# Patient Record
Sex: MALE | Race: WHITE | NOT HISPANIC OR LATINO | Employment: UNEMPLOYED | URBAN - METROPOLITAN AREA
[De-identification: names, ages, dates, MRNs, and addresses within clinical notes are randomized per-mention and may not be internally consistent; named-entity substitution may affect disease eponyms.]

---

## 2017-09-05 ENCOUNTER — LAB REQUISITION (OUTPATIENT)
Dept: LAB | Facility: HOSPITAL | Age: 42
End: 2017-09-05

## 2017-09-05 DIAGNOSIS — J32.4 CHRONIC PANSINUSITIS: ICD-10-CM

## 2017-09-05 PROCEDURE — 87147 CULTURE TYPE IMMUNOLOGIC: CPT | Performed by: OTOLARYNGOLOGY

## 2017-09-05 PROCEDURE — 87205 SMEAR GRAM STAIN: CPT | Performed by: OTOLARYNGOLOGY

## 2017-09-05 PROCEDURE — 87070 CULTURE OTHR SPECIMN AEROBIC: CPT | Performed by: OTOLARYNGOLOGY

## 2017-09-05 PROCEDURE — 87186 SC STD MICRODIL/AGAR DIL: CPT | Performed by: OTOLARYNGOLOGY

## 2017-09-05 PROCEDURE — 87185 SC STD ENZYME DETCJ PER NZM: CPT | Performed by: OTOLARYNGOLOGY

## 2017-09-05 PROCEDURE — 87077 CULTURE AEROBIC IDENTIFY: CPT | Performed by: OTOLARYNGOLOGY

## 2017-09-08 LAB
BACTERIA SPEC AEROBE CULT: ABNORMAL
GRAM STN SPEC: ABNORMAL
GRAM STN SPEC: ABNORMAL

## 2017-10-09 ENCOUNTER — LAB REQUISITION (OUTPATIENT)
Dept: LAB | Facility: HOSPITAL | Age: 42
End: 2017-10-09

## 2017-10-09 DIAGNOSIS — J32.4 CHRONIC PANSINUSITIS: ICD-10-CM

## 2017-10-09 PROCEDURE — 87205 SMEAR GRAM STAIN: CPT | Performed by: OTOLARYNGOLOGY

## 2017-10-09 PROCEDURE — 87070 CULTURE OTHR SPECIMN AEROBIC: CPT | Performed by: OTOLARYNGOLOGY

## 2017-10-09 PROCEDURE — 87186 SC STD MICRODIL/AGAR DIL: CPT | Performed by: OTOLARYNGOLOGY

## 2017-10-09 PROCEDURE — 87077 CULTURE AEROBIC IDENTIFY: CPT | Performed by: OTOLARYNGOLOGY

## 2017-10-09 PROCEDURE — 87185 SC STD ENZYME DETCJ PER NZM: CPT | Performed by: OTOLARYNGOLOGY

## 2017-10-09 PROCEDURE — 87147 CULTURE TYPE IMMUNOLOGIC: CPT | Performed by: OTOLARYNGOLOGY

## 2017-10-11 LAB
BACTERIA SPEC AEROBE CULT: ABNORMAL
BACTERIA SPEC AEROBE CULT: ABNORMAL
GRAM STN SPEC: ABNORMAL

## 2017-10-30 ENCOUNTER — TRANSCRIBE ORDERS (OUTPATIENT)
Dept: ADMINISTRATIVE | Facility: HOSPITAL | Age: 42
End: 2017-10-30

## 2017-10-30 DIAGNOSIS — D38.5 NEOPLASM OF UNCERTAIN BEHAVIOR OF PARANASAL SINUS: Primary | ICD-10-CM

## 2017-11-06 ENCOUNTER — HOSPITAL ENCOUNTER (OUTPATIENT)
Dept: CT IMAGING | Facility: HOSPITAL | Age: 42
Discharge: HOME | End: 2017-11-06
Attending: OTOLARYNGOLOGY | Admitting: OTOLARYNGOLOGY

## 2017-11-06 DIAGNOSIS — D38.5 NEOPLASM OF UNCERTAIN BEHAVIOR OF PARANASAL SINUS: ICD-10-CM

## 2017-11-06 PROCEDURE — 70486 CT MAXILLOFACIAL W/O DYE: CPT

## 2023-07-31 ENCOUNTER — LAB (OUTPATIENT)
Dept: LAB | Facility: HOSPITAL | Age: 48
End: 2023-07-31
Payer: COMMERCIAL

## 2023-07-31 ENCOUNTER — CONSULT (OUTPATIENT)
Dept: ONCOLOGY | Facility: CLINIC | Age: 48
End: 2023-07-31
Payer: COMMERCIAL

## 2023-07-31 VITALS
TEMPERATURE: 97.7 F | DIASTOLIC BLOOD PRESSURE: 82 MMHG | HEART RATE: 71 BPM | HEIGHT: 67 IN | WEIGHT: 151.2 LBS | SYSTOLIC BLOOD PRESSURE: 130 MMHG | BODY MASS INDEX: 23.73 KG/M2

## 2023-07-31 DIAGNOSIS — D72.828 OTHER ELEVATED WHITE BLOOD CELL (WBC) COUNT: ICD-10-CM

## 2023-07-31 DIAGNOSIS — D72.828 OTHER ELEVATED WHITE BLOOD CELL (WBC) COUNT: Primary | ICD-10-CM

## 2023-07-31 PROBLEM — D72.829 LEUKOCYTOSIS: Status: ACTIVE | Noted: 2023-07-31

## 2023-07-31 LAB
ALBUMIN SERPL-MCNC: 4 G/DL (ref 3.5–5.2)
ALBUMIN/GLOB SERPL: 1.4 G/DL
ALP SERPL-CCNC: 82 U/L (ref 39–117)
ALT SERPL W P-5'-P-CCNC: 15 U/L (ref 1–41)
ANION GAP SERPL CALCULATED.3IONS-SCNC: 12 MMOL/L (ref 5–15)
AST SERPL-CCNC: 18 U/L (ref 1–40)
BASOPHILS # BLD AUTO: 0.04 10*3/MM3 (ref 0–0.2)
BASOPHILS NFR BLD AUTO: 0.2 % (ref 0–1.5)
BILIRUB SERPL-MCNC: 0.4 MG/DL (ref 0–1.2)
BUN SERPL-MCNC: 11 MG/DL (ref 6–20)
BUN/CREAT SERPL: 9.4 (ref 7–25)
CALCIUM SPEC-SCNC: 9.2 MG/DL (ref 8.6–10.5)
CHLORIDE SERPL-SCNC: 110 MMOL/L (ref 98–107)
CO2 SERPL-SCNC: 22 MMOL/L (ref 22–29)
CREAT SERPL-MCNC: 1.17 MG/DL (ref 0.76–1.27)
DEPRECATED RDW RBC AUTO: 48.9 FL (ref 37–54)
EGFRCR SERPLBLD CKD-EPI 2021: 77.4 ML/MIN/1.73
EOSINOPHIL # BLD AUTO: 0.1 10*3/MM3 (ref 0–0.4)
EOSINOPHIL NFR BLD AUTO: 0.6 % (ref 0.3–6.2)
ERYTHROCYTE [DISTWIDTH] IN BLOOD BY AUTOMATED COUNT: 14 % (ref 12.3–15.4)
GLOBULIN UR ELPH-MCNC: 2.9 GM/DL
GLUCOSE SERPL-MCNC: 68 MG/DL (ref 65–99)
HCT VFR BLD AUTO: 44.1 % (ref 37.5–51)
HGB BLD-MCNC: 15.1 G/DL (ref 13–17.7)
IMM GRANULOCYTES # BLD AUTO: 0.09 10*3/MM3 (ref 0–0.05)
IMM GRANULOCYTES NFR BLD AUTO: 0.5 % (ref 0–0.5)
LDH SERPL-CCNC: 247 U/L (ref 135–225)
LYMPHOCYTES # BLD AUTO: 3.13 10*3/MM3 (ref 0.7–3.1)
LYMPHOCYTES NFR BLD AUTO: 17.6 % (ref 19.6–45.3)
MCH RBC QN AUTO: 32.1 PG (ref 26.6–33)
MCHC RBC AUTO-ENTMCNC: 34.2 G/DL (ref 31.5–35.7)
MCV RBC AUTO: 93.6 FL (ref 79–97)
MONOCYTES # BLD AUTO: 0.97 10*3/MM3 (ref 0.1–0.9)
MONOCYTES NFR BLD AUTO: 5.5 % (ref 5–12)
NEUTROPHILS NFR BLD AUTO: 13.41 10*3/MM3 (ref 1.7–7)
NEUTROPHILS NFR BLD AUTO: 75.6 % (ref 42.7–76)
PLATELET # BLD AUTO: 253 10*3/MM3 (ref 140–450)
PMV BLD AUTO: 10.1 FL (ref 6–12)
POTASSIUM SERPL-SCNC: 3.8 MMOL/L (ref 3.5–5.2)
PROT SERPL-MCNC: 6.9 G/DL (ref 6–8.5)
RBC # BLD AUTO: 4.71 10*6/MM3 (ref 4.14–5.8)
SODIUM SERPL-SCNC: 144 MMOL/L (ref 136–145)
WBC NRBC COR # BLD: 17.74 10*3/MM3 (ref 3.4–10.8)

## 2023-07-31 PROCEDURE — 85025 COMPLETE CBC W/AUTO DIFF WBC: CPT

## 2023-07-31 PROCEDURE — 80053 COMPREHEN METABOLIC PANEL: CPT

## 2023-07-31 PROCEDURE — 36415 COLL VENOUS BLD VENIPUNCTURE: CPT

## 2023-07-31 PROCEDURE — 85060 BLOOD SMEAR INTERPRETATION: CPT

## 2023-07-31 PROCEDURE — 1126F AMNT PAIN NOTED NONE PRSNT: CPT | Performed by: INTERNAL MEDICINE

## 2023-07-31 PROCEDURE — 83615 LACTATE (LD) (LDH) ENZYME: CPT

## 2023-07-31 PROCEDURE — 99204 OFFICE O/P NEW MOD 45 MIN: CPT | Performed by: INTERNAL MEDICINE

## 2023-08-01 LAB
CYTOLOGIST CVX/VAG CYTO: NORMAL
PATH INTERP BLD-IMP: NORMAL

## 2023-08-02 LAB — Lab: NORMAL

## 2023-08-04 LAB — BCR ABL RESULT: NORMAL

## 2023-09-06 ENCOUNTER — TELEPHONE (OUTPATIENT)
Dept: ONCOLOGY | Facility: CLINIC | Age: 48
End: 2023-09-06
Payer: COMMERCIAL

## 2023-09-06 NOTE — TELEPHONE ENCOUNTER
Return call to Kang.  Kang requesting lab results be faxed to Dr Frederick.  Faxed results to [phone]

## 2023-09-06 NOTE — TELEPHONE ENCOUNTER
Caller: Kang Marvin Jr.    Relationship: Self    Best call back number: [phone]      What was the call regarding: PT CALLED WANTED TO SEE IF HE CAN GET A LAB ORDER TO CHECK HIS WHITE BLOOD CELL COUNT.    PLEASE CALL TO ADVISE

## 2024-06-07 ENCOUNTER — TELEPHONE (OUTPATIENT)
Dept: ONCOLOGY | Facility: CLINIC | Age: 49
End: 2024-06-07
Payer: COMMERCIAL

## 2024-06-07 NOTE — TELEPHONE ENCOUNTER
[facility] received a fax that requires your attention. The document has been indexed to the patient’s chart for your review.      Reason for sending: RECEIVED URGENT REFERRAL FOR EXISTING PATIENT    Documents Description: REFERRAL 06/07/24, MEDICAL SUMMARY 06/07/24, PROGRESS NOTE 06/05/24, 06/04/24, 05/07/24, NURSE NOTE 06/04/24, LAB 05/07/24, 06/04/24, 06/05/24.> INDEXED IN CHART    Name of Sender: VIC KAUR [phone]    Date Indexed: 06/07/24    Notes (if needed): THANKS!

## 2024-06-24 ENCOUNTER — OFFICE VISIT (OUTPATIENT)
Dept: ONCOLOGY | Facility: CLINIC | Age: 49
End: 2024-06-24
Payer: COMMERCIAL

## 2024-06-24 ENCOUNTER — LAB (OUTPATIENT)
Dept: LAB | Facility: HOSPITAL | Age: 49
End: 2024-06-24
Payer: COMMERCIAL

## 2024-06-24 VITALS
DIASTOLIC BLOOD PRESSURE: 87 MMHG | OXYGEN SATURATION: 97 % | SYSTOLIC BLOOD PRESSURE: 137 MMHG | WEIGHT: 140 LBS | HEART RATE: 69 BPM | HEIGHT: 67 IN | TEMPERATURE: 97.7 F | BODY MASS INDEX: 21.97 KG/M2

## 2024-06-24 DIAGNOSIS — D72.828 OTHER ELEVATED WHITE BLOOD CELL (WBC) COUNT: Primary | ICD-10-CM

## 2024-06-24 DIAGNOSIS — D72.828 OTHER ELEVATED WHITE BLOOD CELL (WBC) COUNT: ICD-10-CM

## 2024-06-24 DIAGNOSIS — R63.4 UNINTENTIONAL WEIGHT LOSS: ICD-10-CM

## 2024-06-24 LAB
BASOPHILS # BLD AUTO: 0.04 10*3/MM3 (ref 0–0.2)
BASOPHILS NFR BLD AUTO: 0.2 % (ref 0–1.5)
DEPRECATED RDW RBC AUTO: 51.1 FL (ref 37–54)
EOSINOPHIL # BLD AUTO: 0.11 10*3/MM3 (ref 0–0.4)
EOSINOPHIL NFR BLD AUTO: 0.5 % (ref 0.3–6.2)
ERYTHROCYTE [DISTWIDTH] IN BLOOD BY AUTOMATED COUNT: 14.3 % (ref 12.3–15.4)
HCT VFR BLD AUTO: 44.8 % (ref 37.5–51)
HGB BLD-MCNC: 14.9 G/DL (ref 13–17.7)
IMM GRANULOCYTES # BLD AUTO: 0.06 10*3/MM3 (ref 0–0.05)
IMM GRANULOCYTES NFR BLD AUTO: 0.3 % (ref 0–0.5)
LYMPHOCYTES # BLD AUTO: 3.74 10*3/MM3 (ref 0.7–3.1)
LYMPHOCYTES NFR BLD AUTO: 17.3 % (ref 19.6–45.3)
MCH RBC QN AUTO: 31.4 PG (ref 26.6–33)
MCHC RBC AUTO-ENTMCNC: 33.3 G/DL (ref 31.5–35.7)
MCV RBC AUTO: 94.5 FL (ref 79–97)
MONOCYTES # BLD AUTO: 1.72 10*3/MM3 (ref 0.1–0.9)
MONOCYTES NFR BLD AUTO: 7.9 % (ref 5–12)
NEUTROPHILS NFR BLD AUTO: 15.97 10*3/MM3 (ref 1.7–7)
NEUTROPHILS NFR BLD AUTO: 73.8 % (ref 42.7–76)
PLATELET # BLD AUTO: 308 10*3/MM3 (ref 140–450)
PMV BLD AUTO: 9.8 FL (ref 6–12)
RBC # BLD AUTO: 4.74 10*6/MM3 (ref 4.14–5.8)
WBC NRBC COR # BLD AUTO: 21.64 10*3/MM3 (ref 3.4–10.8)

## 2024-06-24 PROCEDURE — 99214 OFFICE O/P EST MOD 30 MIN: CPT | Performed by: INTERNAL MEDICINE

## 2024-06-24 PROCEDURE — 85025 COMPLETE CBC W/AUTO DIFF WBC: CPT

## 2024-06-24 PROCEDURE — 1126F AMNT PAIN NOTED NONE PRSNT: CPT | Performed by: INTERNAL MEDICINE

## 2024-06-24 PROCEDURE — 36415 COLL VENOUS BLD VENIPUNCTURE: CPT

## 2024-06-24 RX ORDER — ALBUTEROL SULFATE 90 UG/1
AEROSOL, METERED RESPIRATORY (INHALATION)
COMMUNITY

## 2024-06-24 NOTE — PROGRESS NOTES
Follow Up Office Visit      Date: 2024     Patient Name: Kang Mavrin Jr.  MRN: [id number]  : 1975  Referring Physician: Andree Ortega     Chief Complaint: Follow-up for leukocytosis     History of Present Illness: Kang Marvin Jr. is a pleasant 47 y.o. male past medical history of tobacco abuse, anxiety, hyperlipidemia who presents today for evaluation of leukocytosis. The patient has not followed by the PCP is monitoring CBCs which was notable for mild leukocytosis ranging between 21.3-23.6 over the past 3-4 months.  He denies any explained fevers, chills, night sweats.  Does note about a 40 pound weight loss 3-4 months.  Is a 2 pack/day smoker and states that had a CT scan about 1 month ago at [facility].  He notes chronic shortness of breath and cough but denies any new fevers today.  Of note, his differential is predominantly neutrophilic with an ANC between 16-18 over those lab draws.  He denies any family history of lymphoma or leukemia.  He is otherwise compliant with his medications and has no other major concerns or complaints    Interval History:  Presents to clinic for follow-up.  White count has been going up with his PCP.  Patient is lost about 10 pounds since his last visit with me.  Continues to be a heavy smoker.  Denies any fevers or chills.  Denies any night sweats    Oncology History:    Oncology/Hematology History    No history exists.       Subjective      Review of Systems:   Constitutional: Negative for fevers, chills, or weight loss  Eyes: Negative for blurred vision or discharge         Ear/Nose/Throat: Negative for difficulty swallowing, sore throat, LAD                                                       Respiratory: Negative for cough, SOA, wheezing                                                                                        Cardiovascular: Negative for chest pain or palpitations                                                                   Gastrointestinal: Negative for nausea, vomiting or diarrhea                                                                     Genitourinary: Negative for dysuria or hematuria                                                                                           Musculoskeletal: Negative for any joint pains or muscle aches                                                                        Neurologic: Negative for any weakness, headaches, dizziness                                                                         Hematologic: Negative for any easy bleeding or bruising                                                                                   Psychiatric: Negative for anxiety or depression                          Past Medical History/Past Surgical History/ Family History/ Social History: Reviewed by me and unchanged from my previous documentation done on July 2023.     Medications:     Current Outpatient Medications:     Aspirin Low Dose 81 MG EC tablet, TAKE 1 TABLET ONCE DAILY -TAKE WITH FOOD -TAKE WITH PLENTY OF WATER, Disp: , Rfl:     folic acid (FOLVITE) 1 MG tablet, Take 1 tablet by mouth Daily., Disp: , Rfl:     gabapentin (NEURONTIN) 800 MG tablet, Take 1 tablet by mouth 3 (Three) Times a Day., Disp: , Rfl:     lansoprazole (PREVACID) 30 MG capsule, Take 1 capsule by mouth Daily., Disp: , Rfl:     methocarbamol (ROBAXIN) 750 MG tablet, Take 1 tablet by mouth Every 6 (Six) Hours., Disp: , Rfl:     PARoxetine (PAXIL) 40 MG tablet, Take 1 tablet by mouth every night at bedtime., Disp: , Rfl:     ranolazine (RANEXA) 1000 MG 12 hr tablet, TAKE 1 TABLET BY MOUTH TWO TIMES EVERY DAY, Disp: , Rfl:     rOPINIRole (REQUIP) 0.25 MG tablet, TAKE 1 TABLET EACH NIGHT AT BEDTIME, Disp: , Rfl:     simvastatin (ZOCOR) 40 MG tablet, TAKE 1 TABLET ONCE DAILY AT BEDTIME, Disp: , Rfl:     traMADol (ULTRAM) 50 MG tablet, Take 1 tablet by mouth Every 6 (Six) Hours As Needed., Disp: , Rfl:     Trelegy Ellipta  "100-62.5-25 MCG/ACT inhaler, INHALE 1 PUFF BY MOUTH ONE TIME EVERY DAY, Disp: , Rfl:     Ventolin  (90 Base) MCG/ACT inhaler, INHALE 2 PUFFS BY MOUTH EVERY 6 HOURS IF NEEDED, Disp: , Rfl:     Allergies:   No Known Allergies    Objective     Physical Exam:  Vital Signs:   Vitals:    06/24/24 0951   BP: 137/87   Pulse: 69   Temp: 97.7 °F (36.5 °C)   TempSrc: Temporal   SpO2: 97%   Weight: 63.5 kg (140 lb)   Height: 170.2 cm (67.01\")   PainSc: 0-No pain     Pain Score    06/24/24 0951   PainSc: 0-No pain     ECOG Performance Status: 0 - Asymptomatic    Constitutional: NAD, ECOG 0  Eyes: PERRLA, scleral anicteric  ENT: No LAD, no thyromegaly  Respiratory: CTAB, no wheezing, rales, rhonchi  Cardiovascular: RRR, no murmurs, pulses 2+ bilaterally  Abdomen: soft, NT/ND, no HSM  Musculoskeletal: strength 5/5 bilaterally, no c/c/e  Neurologic: A&O x 3, CN II-XII intact grossly    Results Review:   Lab on 06/24/2024   Component Date Value Ref Range Status    WBC 06/24/2024 21.64 (H)  3.40 - 10.80 10*3/mm3 Final    RBC 06/24/2024 4.74  4.14 - 5.80 10*6/mm3 Final    Hemoglobin 06/24/2024 14.9  13.0 - 17.7 g/dL Final    Hematocrit 06/24/2024 44.8  37.5 - 51.0 % Final    MCV 06/24/2024 94.5  79.0 - 97.0 fL Final    MCH 06/24/2024 31.4  26.6 - 33.0 pg Final    MCHC 06/24/2024 33.3  31.5 - 35.7 g/dL Final    RDW 06/24/2024 14.3  12.3 - 15.4 % Final    RDW-SD 06/24/2024 51.1  37.0 - 54.0 fl Final    MPV 06/24/2024 9.8  6.0 - 12.0 fL Final    Platelets 06/24/2024 308  140 - 450 10*3/mm3 Final    Neutrophil % 06/24/2024 73.8  42.7 - 76.0 % Final    Lymphocyte % 06/24/2024 17.3 (L)  19.6 - 45.3 % Final    Monocyte % 06/24/2024 7.9  5.0 - 12.0 % Final    Eosinophil % 06/24/2024 0.5  0.3 - 6.2 % Final    Basophil % 06/24/2024 0.2  0.0 - 1.5 % Final    Immature Grans % 06/24/2024 0.3  0.0 - 0.5 % Final    Neutrophils, Absolute 06/24/2024 15.97 (H)  1.70 - 7.00 10*3/mm3 Final    Lymphocytes, Absolute 06/24/2024 3.74 (H)  0.70 - 3.10 " 10*3/mm3 Final    Monocytes, Absolute 06/24/2024 1.72 (H)  0.10 - 0.90 10*3/mm3 Final    Eosinophils, Absolute 06/24/2024 0.11  0.00 - 0.40 10*3/mm3 Final    Basophils, Absolute 06/24/2024 0.04  0.00 - 0.20 10*3/mm3 Final    Immature Grans, Absolute 06/24/2024 0.06 (H)  0.00 - 0.05 10*3/mm3 Final       No results found.    Assessment / Plan      Assessment/Plan:   1. Other elevated white blood cell (WBC) count (Primary)  -WBC between 21.3-23.6 since March 2023.  Differential predominantly neutrophilic  -Unclear etiology likely related to his tobacco abuse although concerns for possible hematologic malignancy based off his significant weight loss  -Previously check flow cytometry without any immunophenotypic abnormalities, peripheral smear without immature cells or blast.  BCR/ABL testing negative  -Given his weight loss and increased white count, will plan to check CT scans to rule out lymphoma/malignancy  -No indication for bone marrow biopsy at this time    2.  Tobacco abuse  -Patient smoking 2 packs/day  - Given his continued leukocytosis and weight loss, have ordered CT scans as above    3. Unintentional weight loss  -     CT Abdomen Pelvis With Contrast; Future  -     CT Chest With Contrast Diagnostic; Future         Follow Up:   Follow-up after CT scan     Dennis Narayan MD  Hematology and Oncology     Please note that portions of this note may have been completed with a voice recognition program. Efforts were made to edit the dictations, but occasionally words are mistranscribed.

## 2024-07-09 ENCOUNTER — HOSPITAL ENCOUNTER (OUTPATIENT)
Facility: HOSPITAL | Age: 49
Discharge: HOME | End: 2024-07-09
Admitting: INTERNAL MEDICINE
Payer: COMMERCIAL

## 2024-07-09 DIAGNOSIS — R63.4 UNINTENTIONAL WEIGHT LOSS: ICD-10-CM

## 2024-07-09 DIAGNOSIS — D72.828 OTHER ELEVATED WHITE BLOOD CELL (WBC) COUNT: ICD-10-CM

## 2024-07-09 PROCEDURE — 25510000001 IOPAMIDOL 61 % SOLUTION: Performed by: INTERNAL MEDICINE

## 2024-07-09 PROCEDURE — 71260 CT THORAX DX C+: CPT

## 2024-07-09 PROCEDURE — 74177 CT ABD & PELVIS W/CONTRAST: CPT

## 2024-07-09 RX ADMIN — IOPAMIDOL 84 ML: 612 INJECTION, SOLUTION INTRAVENOUS at 09:50

## 2024-07-22 ENCOUNTER — OFFICE VISIT (OUTPATIENT)
Dept: ONCOLOGY | Facility: CLINIC | Age: 49
End: 2024-07-22
Payer: COMMERCIAL

## 2024-07-22 VITALS
TEMPERATURE: 97.9 F | SYSTOLIC BLOOD PRESSURE: 126 MMHG | HEART RATE: 72 BPM | DIASTOLIC BLOOD PRESSURE: 85 MMHG | RESPIRATION RATE: 20 BRPM | OXYGEN SATURATION: 98 % | BODY MASS INDEX: 22.91 KG/M2 | HEIGHT: 67 IN | WEIGHT: 146 LBS

## 2024-07-22 DIAGNOSIS — D72.828 OTHER ELEVATED WHITE BLOOD CELL (WBC) COUNT: Primary | ICD-10-CM

## 2024-07-22 DIAGNOSIS — R91.1 LUNG NODULE: ICD-10-CM

## 2024-07-22 PROCEDURE — 99214 OFFICE O/P EST MOD 30 MIN: CPT | Performed by: INTERNAL MEDICINE

## 2024-07-22 PROCEDURE — 1126F AMNT PAIN NOTED NONE PRSNT: CPT | Performed by: INTERNAL MEDICINE

## 2024-07-22 NOTE — PROGRESS NOTES
Follow Up Office Visit      Date: 2024     Patient Name: Kang Marvin Jr.  MRN: [id number]  : 1975  Referring Physician: Andree Ortega     Chief Complaint: Follow-up for leukocytosis     History of Present Illness: Kang Marvin Jr. is a pleasant 47 y.o. male past medical history of tobacco abuse, anxiety, hyperlipidemia who presents today for evaluation of leukocytosis. The patient has not followed by the PCP is monitoring CBCs which was notable for mild leukocytosis ranging between 21.3-23.6 over the past 3-4 months.  He denies any explained fevers, chills, night sweats.  Does note about a 40 pound weight loss 3-4 months.  Is a 2 pack/day smoker and states that had a CT scan about 1 month ago at [facility].  He notes chronic shortness of breath and cough but denies any new fevers today.  Of note, his differential is predominantly neutrophilic with an ANC between 16-18 over those lab draws.  He denies any family history of lymphoma or leukemia.  He is otherwise compliant with his medications and has no other major concerns or complaints     Interval History:  Presents to clinic for follow-up.  Does note feeling poorly over the past couple days.  Denies any fevers or chills.  Denies any productive cough    Oncology History:    Oncology/Hematology History    No history exists.       Subjective      Review of Systems:   Constitutional: Negative for fevers, chills, or weight loss  Eyes: Negative for blurred vision or discharge         Ear/Nose/Throat: Negative for difficulty swallowing, sore throat, LAD                                                       Respiratory: Negative for cough, SOA, wheezing                                                                                        Cardiovascular: Negative for chest pain or palpitations                                                                  Gastrointestinal: Negative for nausea, vomiting or diarrhea                                                                      Genitourinary: Negative for dysuria or hematuria                                                                                           Musculoskeletal: Negative for any joint pains or muscle aches                                                                        Neurologic: Negative for any weakness, headaches, dizziness                                                                         Hematologic: Negative for any easy bleeding or bruising                                                                                   Psychiatric: Negative for anxiety or depression                          Past Medical History/Past Surgical History/ Family History/ Social History: Reviewed by me and unchanged from my previous documentation done on June 2024.     Medications:     Current Outpatient Medications:     Aspirin Low Dose 81 MG EC tablet, TAKE 1 TABLET ONCE DAILY -TAKE WITH FOOD -TAKE WITH PLENTY OF WATER, Disp: , Rfl:     folic acid (FOLVITE) 1 MG tablet, Take 1 tablet by mouth Daily., Disp: , Rfl:     gabapentin (NEURONTIN) 800 MG tablet, Take 1 tablet by mouth 3 (Three) Times a Day., Disp: , Rfl:     lansoprazole (PREVACID) 30 MG capsule, Take 1 capsule by mouth Daily., Disp: , Rfl:     methocarbamol (ROBAXIN) 750 MG tablet, Take 1 tablet by mouth Every 6 (Six) Hours., Disp: , Rfl:     PARoxetine (PAXIL) 40 MG tablet, Take 1 tablet by mouth every night at bedtime., Disp: , Rfl:     ranolazine (RANEXA) 1000 MG 12 hr tablet, TAKE 1 TABLET BY MOUTH TWO TIMES EVERY DAY, Disp: , Rfl:     rOPINIRole (REQUIP) 0.25 MG tablet, TAKE 1 TABLET EACH NIGHT AT BEDTIME, Disp: , Rfl:     simvastatin (ZOCOR) 40 MG tablet, TAKE 1 TABLET ONCE DAILY AT BEDTIME, Disp: , Rfl:     traMADol (ULTRAM) 50 MG tablet, Take 1 tablet by mouth Every 6 (Six) Hours As Needed., Disp: , Rfl:     Trelegy Ellipta 100-62.5-25 MCG/ACT inhaler, INHALE 1 PUFF BY MOUTH ONE TIME EVERY DAY, Disp: ,  "Rfl:     Ventolin  (90 Base) MCG/ACT inhaler, INHALE 2 PUFFS BY MOUTH EVERY 6 HOURS IF NEEDED, Disp: , Rfl:     Allergies:   No Known Allergies    Objective     Physical Exam:  Vital Signs:   Vitals:    07/22/24 0947   BP: 126/85  Comment: JOANNAE   Pulse: 72   Resp: 20   Temp: 97.9 °F (36.6 °C)   TempSrc: Temporal   SpO2: 98%  Comment: RA   Weight: 66.2 kg (146 lb)   Height: 170.2 cm (67\")   PainSc: 0-No pain     Pain Score    07/22/24 0947   PainSc: 0-No pain     ECOG Performance Status: 0 - Asymptomatic    Constitutional: NAD, ECOG 0  Eyes: PERRLA, scleral anicteric  ENT: No LAD, no thyromegaly  Respiratory: CTAB, no wheezing, rales, rhonchi  Cardiovascular: RRR, no murmurs, pulses 2+ bilaterally  Abdomen: soft, NT/ND, no HSM  Musculoskeletal: strength 5/5 bilaterally, no c/c/e  Neurologic: A&O x 3, CN II-XII intact grossly    Results Review:   No visits with results within 2 Week(s) from this visit.   Latest known visit with results is:   Lab on 06/24/2024   Component Date Value Ref Range Status    WBC 06/24/2024 21.64 (H)  3.40 - 10.80 10*3/mm3 Final    RBC 06/24/2024 4.74  4.14 - 5.80 10*6/mm3 Final    Hemoglobin 06/24/2024 14.9  13.0 - 17.7 g/dL Final    Hematocrit 06/24/2024 44.8  37.5 - 51.0 % Final    MCV 06/24/2024 94.5  79.0 - 97.0 fL Final    MCH 06/24/2024 31.4  26.6 - 33.0 pg Final    MCHC 06/24/2024 33.3  31.5 - 35.7 g/dL Final    RDW 06/24/2024 14.3  12.3 - 15.4 % Final    RDW-SD 06/24/2024 51.1  37.0 - 54.0 fl Final    MPV 06/24/2024 9.8  6.0 - 12.0 fL Final    Platelets 06/24/2024 308  140 - 450 10*3/mm3 Final    Neutrophil % 06/24/2024 73.8  42.7 - 76.0 % Final    Lymphocyte % 06/24/2024 17.3 (L)  19.6 - 45.3 % Final    Monocyte % 06/24/2024 7.9  5.0 - 12.0 % Final    Eosinophil % 06/24/2024 0.5  0.3 - 6.2 % Final    Basophil % 06/24/2024 0.2  0.0 - 1.5 % Final    Immature Grans % 06/24/2024 0.3  0.0 - 0.5 % Final    Neutrophils, Absolute 06/24/2024 15.97 (H)  1.70 - 7.00 10*3/mm3 Final    " Lymphocytes, Absolute 06/24/2024 3.74 (H)  0.70 - 3.10 10*3/mm3 Final    Monocytes, Absolute 06/24/2024 1.72 (H)  0.10 - 0.90 10*3/mm3 Final    Eosinophils, Absolute 06/24/2024 0.11  0.00 - 0.40 10*3/mm3 Final    Basophils, Absolute 06/24/2024 0.04  0.00 - 0.20 10*3/mm3 Final    Immature Grans, Absolute 06/24/2024 0.06 (H)  0.00 - 0.05 10*3/mm3 Final       CT Abdomen Pelvis With Contrast    Result Date: 7/11/2024  Narrative: CT CHEST W CONTRAST DIAGNOSTIC, CT ABDOMEN PELVIS W CONTRAST Date of Exam: 7/9/2024 9:39 AM EDT Indication: leukocytosis, heavy smoking, unintentional weight loss. Comparison: None available. Technique: Axial CT images were obtained of the chest, abdomen, and pelvis after the uneventful intravenous administration of 84 mL Isovue-300.  Reconstructed coronal and sagittal images were also obtained. Automated exposure control and iterative construction methods were used. Findings: Chest: Unremarkable appearance of the thoracic aorta and pulmonary arteries. Unremarkable appearance of the cardiac chambers. No pericardial effusion. There are trace coronary artery calcifications. Unremarkable appearance of the thyroid. Unremarkable appearance of the anterior mediastinum. Unremarkable appearance of the thoracic esophagus. No bulky or suspicious thoracic lymph nodes. There are couple small calcified mediastinal and right hilar nodes compatible with sequelae of healed granulomatous disease. Unremarkable appearance of the chest wall soft tissues. The trachea and mainstem bronchi are patent. The smaller peripheral airways are normal. There is mild centrilobular emphysema worst in the upper lobes. There is a thin linear solid nodular density at the right apex with some adjacent and contiguous thin wispy densities extending to the pleura; the largest measurable portion of this density measures approximately 7 mm in diameter (series 3 image 22). Otherwise unremarkable appearance of the  lungs. No pleural fluid  or pneumothorax. No acute or suspicious bony findings. Abdomen and pelvis: Unremarkable appearance of the liver without evidence of focal liver lesion. The gallbladder is surgically absent. Normal appearance of the bile ducts. Normal size and appearance of the spleen. Unremarkable appearance of the pancreas. Unremarkable appearance of the adrenal glands. There is some renal cortical thickening at the superior and mid pole of the right kidney with otherwise unremarkable appearance of the kidneys. Normal appearance of the ureters. There is some circumferential thickening of the urinary bladder with tiny left-sided bladder diverticulum, possibly reflecting sequelae of chronic bladder outlet obstruction although the prostate appears grossly unremarkable in size and appearance. Normal appearance of the seminal vesicles. No bowel obstruction or inflammatory change of the GI tract. Normal appendix. No bulky or suspicious abdominopelvic lymph nodes. No ascites or discrete peritoneal or omental or visceral deposit. Mild atherosclerosis of the abdominal aorta with otherwise unremarkable appearance of the vasculature. Grossly unremarkable appearance of the body wall soft tissues. No acute or suspicious bony findings.     Impression: Impression: No CT evidence of definite primary malignancy or metastatic disease. No acute findings. There is a 7 mm linear density at the right apex with some contiguous fine linear densities extending to the pleura. This is favored to reflect scarring although the possibility of a linear 7 mm pulmonary nodule is difficult to entirely exclude. Recommend comparison with an outside study for evaluation of temporal stability. If not available, per Fleischner Society guidelines for a single nodule measuring 6 to 8 mm, a low-dose follow-up CT is recommended in 6-12 months if the patient is considered at low risk for lung cancer. If the patient is considered to be high-risk for lung cancer, a low-dose CT  is recommended in 6-12 months and in 18-24 months. Additional chronic and incidental ancillary findings noted above. Electronically Signed: Edu Gee MD  7/11/2024 10:59 AM EDT  Workstation ID: [id number]    CT Chest With Contrast Diagnostic    Result Date: 7/11/2024  Narrative: CT CHEST W CONTRAST DIAGNOSTIC, CT ABDOMEN PELVIS W CONTRAST Date of Exam: 7/9/2024 9:39 AM EDT Indication: leukocytosis, heavy smoking, unintentional weight loss. Comparison: None available. Technique: Axial CT images were obtained of the chest, abdomen, and pelvis after the uneventful intravenous administration of 84 mL Isovue-300.  Reconstructed coronal and sagittal images were also obtained. Automated exposure control and iterative construction methods were used. Findings: Chest: Unremarkable appearance of the thoracic aorta and pulmonary arteries. Unremarkable appearance of the cardiac chambers. No pericardial effusion. There are trace coronary artery calcifications. Unremarkable appearance of the thyroid. Unremarkable appearance of the anterior mediastinum. Unremarkable appearance of the thoracic esophagus. No bulky or suspicious thoracic lymph nodes. There are couple small calcified mediastinal and right hilar nodes compatible with sequelae of healed granulomatous disease. Unremarkable appearance of the chest wall soft tissues. The trachea and mainstem bronchi are patent. The smaller peripheral airways are normal. There is mild centrilobular emphysema worst in the upper lobes. There is a thin linear solid nodular density at the right apex with some adjacent and contiguous thin wispy densities extending to the pleura; the largest measurable portion of this density measures approximately 7 mm in diameter (series 3 image 22). Otherwise unremarkable appearance of the  lungs. No pleural fluid or pneumothorax. No acute or suspicious bony findings. Abdomen and pelvis: Unremarkable appearance of the liver without evidence of focal liver  lesion. The gallbladder is surgically absent. Normal appearance of the bile ducts. Normal size and appearance of the spleen. Unremarkable appearance of the pancreas. Unremarkable appearance of the adrenal glands. There is some renal cortical thickening at the superior and mid pole of the right kidney with otherwise unremarkable appearance of the kidneys. Normal appearance of the ureters. There is some circumferential thickening of the urinary bladder with tiny left-sided bladder diverticulum, possibly reflecting sequelae of chronic bladder outlet obstruction although the prostate appears grossly unremarkable in size and appearance. Normal appearance of the seminal vesicles. No bowel obstruction or inflammatory change of the GI tract. Normal appendix. No bulky or suspicious abdominopelvic lymph nodes. No ascites or discrete peritoneal or omental or visceral deposit. Mild atherosclerosis of the abdominal aorta with otherwise unremarkable appearance of the vasculature. Grossly unremarkable appearance of the body wall soft tissues. No acute or suspicious bony findings.     Impression: Impression: No CT evidence of definite primary malignancy or metastatic disease. No acute findings. There is a 7 mm linear density at the right apex with some contiguous fine linear densities extending to the pleura. This is favored to reflect scarring although the possibility of a linear 7 mm pulmonary nodule is difficult to entirely exclude. Recommend comparison with an outside study for evaluation of temporal stability. If not available, per Fleischner Society guidelines for a single nodule measuring 6 to 8 mm, a low-dose follow-up CT is recommended in 6-12 months if the patient is considered at low risk for lung cancer. If the patient is considered to be high-risk for lung cancer, a low-dose CT is recommended in 6-12 months and in 18-24 months. Additional chronic and incidental ancillary findings noted above. Electronically Signed:  Edu Gee MD  7/11/2024 10:59 AM EDT  Workstation ID: [id number]     Assessment / Plan      Assessment/Plan:   1. Other elevated white blood cell (WBC) count (Primary)  -WBC between 21.3-23.6 since March 2023.  Differential predominantly neutrophilic  -Unclear etiology likely related to his tobacco abuse although concerns for possible hematologic malignancy based off his significant weight loss  -Previously check flow cytometry without any immunophenotypic abnormalities, peripheral smear without immature cells or blast.  BCR/ABL testing negative  -CT scans with no overt malignancy.  7 mm nodular density in the right upper lobe.  Management as below  -No indication for bone marrow biopsy at this time     2.  Tobacco abuse  -Patient smoking 2 packs/day  -Counseled on cessation    3. Lung nodule  -CT scan in July 2024 notable for 7 mm density concerning for scar versus nodule in the right upper lobe  -Given his smoking history, will plan for repeat CT scan in 1 year.  Ordered today  -     CT Chest With Contrast Diagnostic; Future         Follow Up:   Follow-up in 1 year     Dennis Narayan MD  Hematology and Oncology     Please note that portions of this note may have been completed with a voice recognition program. Efforts were made to edit the dictations, but occasionally words are mistranscribed.

## 2025-07-09 ENCOUNTER — TELEPHONE (OUTPATIENT)
Dept: ONCOLOGY | Facility: CLINIC | Age: 50
End: 2025-07-09

## 2025-07-24 ENCOUNTER — TELEPHONE (OUTPATIENT)
Dept: ONCOLOGY | Facility: CLINIC | Age: 50
End: 2025-07-24
Payer: COMMERCIAL

## 2025-07-24 NOTE — TELEPHONE ENCOUNTER
DUSTIN WITH [facility] CALLED TO INFORM THAT PATIENT IS SCHEDULED FOR CT SCAN  BUT THAT THE ORDER WILL  ON 25 AND IS REQUESTING A NEW ORDER. PLEASE ADVISE.

## 2025-07-25 DIAGNOSIS — R91.1 LUNG NODULE: Primary | ICD-10-CM

## 2025-07-25 NOTE — TELEPHONE ENCOUNTER
Return call to Florecita.  Advised new order placed for CT scan chest. Florecita states she will make sure the authorization is linked to the CT scan